# Patient Record
Sex: FEMALE | Race: WHITE | Employment: FULL TIME | URBAN - METROPOLITAN AREA
[De-identification: names, ages, dates, MRNs, and addresses within clinical notes are randomized per-mention and may not be internally consistent; named-entity substitution may affect disease eponyms.]

---

## 2017-11-02 ENCOUNTER — HOSPITAL ENCOUNTER (OUTPATIENT)
Dept: MAMMOGRAPHY | Age: 57
Discharge: HOME | End: 2017-11-02
Attending: OBSTETRICS & GYNECOLOGY | Admitting: OBSTETRICS & GYNECOLOGY

## 2017-11-02 DIAGNOSIS — Z12.31 VISIT FOR SCREENING MAMMOGRAM: ICD-10-CM

## 2018-11-02 ENCOUNTER — HOSPITAL ENCOUNTER (OUTPATIENT)
Dept: MAMMOGRAPHY | Age: 58
Discharge: HOME | End: 2018-11-07
Payer: COMMERCIAL

## 2018-11-02 DIAGNOSIS — Z12.31 VISIT FOR SCREENING MAMMOGRAM: ICD-10-CM

## 2018-11-02 PROCEDURE — 77067 SCR MAMMO BI INCL CAD: CPT

## 2019-11-06 ENCOUNTER — HOSPITAL ENCOUNTER (OUTPATIENT)
Dept: MAMMOGRAPHY | Age: 59
Discharge: HOME | End: 2019-11-11
Payer: COMMERCIAL

## 2019-11-06 DIAGNOSIS — Z12.31 VISIT FOR SCREENING MAMMOGRAM: ICD-10-CM

## 2019-11-06 PROCEDURE — 77067 SCR MAMMO BI INCL CAD: CPT

## 2020-11-11 ENCOUNTER — HOSPITAL ENCOUNTER (OUTPATIENT)
Dept: MAMMOGRAPHY | Age: 60
Discharge: HOME | End: 2020-11-11
Payer: COMMERCIAL

## 2020-11-11 PROCEDURE — 77063 BREAST TOMOSYNTHESIS BI: CPT

## 2021-11-21 ENCOUNTER — HOSPITAL ENCOUNTER (EMERGENCY)
Age: 61
Discharge: HOME | End: 2021-11-21
Payer: COMMERCIAL

## 2021-11-21 ENCOUNTER — APPOINTMENT (OUTPATIENT)
Dept: GENERAL RADIOLOGY | Age: 61
End: 2021-11-21
Payer: COMMERCIAL

## 2021-11-21 VITALS
SYSTOLIC BLOOD PRESSURE: 162 MMHG | OXYGEN SATURATION: 100 % | RESPIRATION RATE: 18 BRPM | TEMPERATURE: 97.8 F | BODY MASS INDEX: 20.94 KG/M2 | HEART RATE: 77 BPM | DIASTOLIC BLOOD PRESSURE: 93 MMHG | HEIGHT: 65 IN | WEIGHT: 125.66 LBS

## 2021-11-21 DIAGNOSIS — S92.355A CLOSED NONDISPLACED FRACTURE OF FIFTH METATARSAL BONE OF LEFT FOOT, INITIAL ENCOUNTER: Primary | ICD-10-CM

## 2021-11-21 PROCEDURE — 99282 EMERGENCY DEPT VISIT SF MDM: CPT

## 2021-11-21 PROCEDURE — 73630 X-RAY EXAM OF FOOT: CPT

## 2021-11-21 PROCEDURE — 99283 EMERGENCY DEPT VISIT LOW MDM: CPT

## 2021-11-21 RX ORDER — VENLAFAXINE 37.5 MG/1
37.5 TABLET ORAL DAILY
COMMUNITY

## 2021-11-21 ASSESSMENT — ENCOUNTER SYMPTOMS
DIARRHEA: 0
VOMITING: 0
ABDOMINAL PAIN: 0
NAUSEA: 0
COUGH: 0
RHINORRHEA: 0
WHEEZING: 0
SHORTNESS OF BREATH: 0

## 2021-11-21 ASSESSMENT — PAIN SCALES - GENERAL: PAINLEVEL_OUTOF10: 10

## 2021-11-21 ASSESSMENT — PAIN DESCRIPTION - PAIN TYPE: TYPE: ACUTE PAIN

## 2021-11-21 ASSESSMENT — PAIN DESCRIPTION - LOCATION: LOCATION: FOOT

## 2021-11-21 ASSESSMENT — PAIN DESCRIPTION - ORIENTATION: ORIENTATION: LEFT

## 2021-11-22 NOTE — ED TRIAGE NOTES
Pt. States that she was getting up off of the floor about an hour ago and felt something \"pop\" in her left foot. Pt c/o pain mostly on the lateral side of left foot also going along the top. Good ROM.

## 2021-11-22 NOTE — ED PROVIDER NOTES
[facility]        Pt Name: Jj Moya  MRN: [id number]  [place] 1960  Date of evaluation: 11/21/2021  Provider: Ronie Ganser, PA-C  PCP: Samreen Grimm  Note Started: 8:15 PM EST       NANCY. I have evaluated this patient. My supervising physician was available for consultation. CHIEF COMPLAINT       Chief Complaint   Patient presents with    Foot Pain       HISTORY OF PRESENT ILLNESS   (Location, Timing/Onset, Context/Setting, Quality, Duration, Modifying Factors, Severity, Associated Signs and Symptoms)  Note limiting factors. Chief Complaint: Foot injury     Jj Myoa is a 62 y.o. female who presents for evaluation of injury to her left foot. Patient states that she was sitting on the floor and went to stand up and her left foot popped. States that she is since had pain and swelling laterally. Difficulty with ambulation. No numbness tingling weakness distally. States that she has tried ice and elevation without improvement in symptoms. She has no other injuries or complaints at this time. Nursing Notes were all reviewed and agreed with or any disagreements were addressed in the HPI. REVIEW OF SYSTEMS    (2-9 systems for level 4, 10 or more for level 5)     Review of Systems   Constitutional: Negative for appetite change, chills and fever. HENT: Negative for congestion and rhinorrhea. Respiratory: Negative for cough, shortness of breath and wheezing. Cardiovascular: Negative for chest pain. Gastrointestinal: Negative for abdominal pain, diarrhea, nausea and vomiting. Genitourinary: Negative for difficulty urinating, dysuria and hematuria. Musculoskeletal: Positive for arthralgias (L foot). Negative for neck pain and neck stiffness. Skin: Negative for rash. Neurological: Negative for weakness, numbness and headaches. Positives and Pertinent negatives as per HPI. Except as noted above in the ROS, all other systems were reviewed and negative. PAST MEDICAL HISTORY     Past Medical History:   Diagnosis Date    Anxiety          SURGICAL HISTORY   History reviewed. No pertinent surgical history. CURRENTMEDICATIONS       Previous Medications    VENLAFAXINE (EFFEXOR) 37.5 MG TABLET    Take 37.5 mg by mouth 3 times daily         ALLERGIES     Codeine    FAMILYHISTORY     History reviewed. No pertinent family history. SOCIAL HISTORY       Social History     Tobacco Use    Smoking status: Current Every Day Smoker    Smokeless tobacco: Not on file   Substance Use Topics    Alcohol use: Yes    Drug use: Never       SCREENINGS             PHYSICAL EXAM    (up to 7 for level 4, 8 or more for level 5)     ED Triage Vitals [11/21/21 1957]   BP Temp Temp Source Pulse Resp SpO2 Height Weight   (!) 172/78 97.8 °F (36.6 °C) Oral 77 18 100 % 5' 5\" (1.651 m) 125 lb 10.6 oz (57 kg)       Physical Exam  Vitals and nursing note reviewed. Constitutional:       Appearance: She is well-developed. She is not diaphoretic. HENT:      Head: Normocephalic and atraumatic. Right Ear: External ear normal.      Left Ear: External ear normal.      Nose: Nose normal.   Eyes:      General:         Right eye: No discharge. Left eye: No discharge. Cardiovascular:      Pulses: Normal pulses. Pulmonary:      Effort: Pulmonary effort is normal. No respiratory distress. Musculoskeletal:         General: Normal range of motion. Cervical back: Normal range of motion and neck supple. Feet:    Skin:     General: Skin is warm and dry. Neurological:      Mental Status: She is alert and oriented to person, place, and time. Sensory: Sensation is intact. Motor: Motor function is intact.    Psychiatric:         Behavior: Behavior normal.         DIAGNOSTIC RESULTS   LABS:    Labs Reviewed - No data to display    When ordered only abnormal lab results are displayed. All other labs were within normal range or not returned as of this dictation. EKG: When ordered, EKG's are interpreted by the Emergency Department Physician in the absence of a cardiologist.  Please see their note for interpretation of EKG. RADIOLOGY:   Non-plain film images such as CT, Ultrasound and MRI are read by the radiologist. Plain radiographic images are visualized and preliminarily interpreted by the ED Provider with the below findings:        Interpretation per the Radiologist below, if available at the time of this note:    XR FOOT LEFT (MIN 3 VIEWS)    (Results Pending)     No results found. PROCEDURES   Unless otherwise noted below, none     Procedures    CRITICAL CARE TIME   N/A    CONSULTS:  None      EMERGENCY DEPARTMENT COURSE and DIFFERENTIAL DIAGNOSIS/MDM:   Vitals:    Vitals:    11/21/21 1957 11/21/21 2032   BP: (!) 172/78 (!) 162/93   Pulse: 77    Resp: 18    Temp: 97.8 °F (36.6 °C)    TempSrc: Oral    SpO2: 100%    Weight: 125 lb 10.6 oz (57 kg)    Height: 5' 5\" (1.651 m)        Patient was given the following medications:  Medications - No data to display        Patient presents for evaluation of injury to her left foot. On exam, she is resting comfortably in bed no acute distress nontoxic. She is hypertensive but vitals otherwise stable and she is afebrile. Patient has tenderness swelling and ecchymotic discoloration to the lateral aspect of the left dorsal foot. There is no bony tenderness step-offs crepitus obvious deformity or dislocation. She is neurovascular intact distally and able to wiggle her toes. X-ray shows fracture of the left fifth metatarsal.  Is beginning orthopedic surgeon who recommends postop shoe and follow-up with her in the office on Tuesday. She was given Ace wrap, postop shoe. She can weight-bear as tolerated. Symptomatic and supportive care discussed including rest, ice and elevation. She can take Tylenol and/or ibuprofen.     I estimate there is LOW risk for COMPARTMENT SYNDROME, DEEP VENOUS THROMBOSIS, SEPTIC ARTHRITIS, TENDON OR NEUROVASCULAR INJURY, thus I consider the discharge disposition reasonable. Conditions for return to the ED were discussed such as any new or worsening symptoms or signs of neurovascular compromise, tractable pain or inability to ambulate. She is agreeable to this plan stable for discharge at this time. FINAL IMPRESSION      1.  Closed nondisplaced fracture of fifth metatarsal bone of left foot, initial encounter          DISPOSITION/PLAN   DISPOSITION Decision To Discharge 11/21/2021 09:32:03 PM      PATIENT REFERRED TO:  Wilbert Ramos MD  [address] [facility], [address] [place] [address] [address]  [address] [facility]  [phone]    Go on 11/23/2021  [place]  Emergency Department  [facility] [address]  [phone]        [facility] Emergency Department  [facility] [address]  [phone]  Go to   If symptoms worsen      DISCHARGE MEDICATIONS:  New Prescriptions    No medications on file       DISCONTINUED MEDICATIONS:  Discontinued Medications    No medications on file              (Please note that portions of this note were completed with a voice recognition program.  Efforts were made to edit the dictations but occasionally words are mis-transcribed.)    Ronie Ganser, PA-C (electronically signed)           [place], [place]  11/21/21 3803

## 2021-11-22 NOTE — ED NOTES
Pt discharged in stable condition, VSS, no signs of distress. Discharge instructions and meds reviewed. Pt verbalizes understanding and states no further questions or concerns unaddressed.        Robyn Callahan RN  11/21/21 8718

## 2021-11-23 ENCOUNTER — OFFICE VISIT (OUTPATIENT)
Dept: ORTHOPEDIC SURGERY | Age: 61
End: 2021-11-23
Payer: COMMERCIAL

## 2021-11-23 VITALS — HEIGHT: 65 IN | WEIGHT: 125 LBS | BODY MASS INDEX: 20.83 KG/M2

## 2021-11-23 DIAGNOSIS — F17.200 CURRENT SMOKER: ICD-10-CM

## 2021-11-23 DIAGNOSIS — S92.352A CLOSED DISPLACED FRACTURE OF FIFTH METATARSAL BONE OF LEFT FOOT, INITIAL ENCOUNTER: Primary | ICD-10-CM

## 2021-11-23 PROCEDURE — 99406 BEHAV CHNG SMOKING 3-10 MIN: CPT | Performed by: ORTHOPAEDIC SURGERY

## 2021-11-23 PROCEDURE — 99203 OFFICE O/P NEW LOW 30 MIN: CPT | Performed by: ORTHOPAEDIC SURGERY

## 2021-11-23 NOTE — PROGRESS NOTES
CHIEF COMPLAINT: Left foot pain/ 5th MT shaft minimally displaced fracture. DATE OF INJURY:  11/20/2021, DOT 11/23/2021    HISTORY:  Ms. Ailin Tolliver 62 y.o.  female presents today for the first visit for evaluation of a left foot injury which occurred when she fell. She is complaining of lateral foot pain and swelling. Rates pain a 8/10 VAS. This is better with elevation and worse with bearing any wt. The pain is sharp and not radiating. No other complaint. She was seen 1st at [facility], where she was x-rayed and splinted and asked to f/u with orthopaedics. She is a smoker. She works as RN at [employer]. Past Medical History:   Diagnosis Date    Anxiety        History reviewed. No pertinent surgical history. Social History     Socioeconomic History    Marital status:      Spouse name: Not on file    Number of children: Not on file    Years of education: Not on file    Highest education level: Not on file   Occupational History    Not on file   Tobacco Use    Smoking status: Current Every Day Smoker    Smokeless tobacco: Never Used   Vaping Use    Vaping Use: Never used   Substance and Sexual Activity    Alcohol use: Yes    Drug use: Never    Sexual activity: Not on file   Other Topics Concern    Not on file   Social History Narrative    Not on file     Social Determinants of Health     Financial Resource Strain:     Difficulty of Paying Living Expenses: Not on file   Food Insecurity:     Worried About Running Out of Food in the Last Year: Not on file    Carline of Food in the Last Year: Not on file   Transportation Needs:     Lack of Transportation (Medical): Not on file    Lack of Transportation (Non-Medical):  Not on file   Physical Activity:     Days of Exercise per Week: Not on file    Minutes of Exercise per Session: Not on file   Stress:     Feeling of Stress : Not on file   Social Connections:     Frequency of Communication with Friends and Family: Not on file    Frequency of Social Gatherings with Friends and Family: Not on file    Attends Hinduism Services: Not on file    Active Member of Clubs or Organizations: Not on file    Attends Club or Organization Meetings: Not on file    Marital Status: Not on file   Intimate Partner Violence:     Fear of Current or Ex-Partner: Not on file    Emotionally Abused: Not on file    Physically Abused: Not on file    Sexually Abused: Not on file   Housing Stability:     Unable to Pay for Housing in the Last Year: Not on file    Number of [place] in the Last Year: Not on file    Unstable Housing in the Last Year: Not on file       History reviewed. No pertinent family history. Current Outpatient Medications on File Prior to Visit   Medication Sig Dispense Refill    venlafaxine (EFFEXOR) 37.5 MG tablet Take 37.5 mg by mouth 3 times daily       No current facility-administered medications on file prior to visit. Pertinent items are noted in HPI  Review of systems reviewed from Patient History Form dated on 11/23/2021 and available in the patient's chart under the Media tab. No change. PHYSICAL EXAMINATION:  Ms. Nikki La is a very pleasant 62 y.o.  female who presents today in no acute distress, awake, alert, and oriented. She is well dressed, nourished and  groomed. Patient with normal affect. Height is  5' 5\" (1.651 m), weight is 125 lb (56.7 kg), Body mass index is 20.8 kg/m². Resting respiratory rate is 16. Examination of the gait, showed that the patient walks with a crutch, PWB left leg in a postop shoe. Examination of both ankles showing a decreased range of motion of the left ankle compare to the other side because of foot pain. There is moderate swelling that can be seen, as well as ecchymosis over lateral side of the left foot. She  has intact sensation and good pedal pulses.   She has significant tenderness on deep palpation over the 5th MT shaft left foot                  IMAGING: Josiane Albrecht were reviewed, dated 11/21/2021,  3 views of the left foot, and showed a minimally displaced 5th MT shaft fracture. IMPRESSION: Left 5th MT shaft minimally displaced fracture. PLAN: I discussed that the overall alignment of this fracture is good and that we can try to treat this non-operatively in a post-op shoe with WBAT. We discussed the risk of nonunion and  malunion. We will see her  back in 6 weeks at which time we will get a new xray of the left foot. The patient smokes, and we discussed with the patient the risks of smoking on general health and also on bone and soft tissue healing (delay and non-union), and promised to cut down or stop smoking. Smoking: Educated the patient regarding the hazards of smoking and that it harms their body in many ways. It increases the chance of developing heart disease, lung disease, cancer, and other health problems including poor bone and wound healing. The importance of smoking cessation for optimal bone and wound healing was stressed. This was communicated verbally, 5 Minutes.       Yves Mi MD

## 2022-01-06 ENCOUNTER — OFFICE VISIT (OUTPATIENT)
Dept: ORTHOPEDIC SURGERY | Age: 62
End: 2022-01-06

## 2022-01-06 VITALS — WEIGHT: 125 LBS | HEIGHT: 65 IN | BODY MASS INDEX: 20.83 KG/M2

## 2022-01-06 DIAGNOSIS — S92.352A CLOSED DISPLACED FRACTURE OF FIFTH METATARSAL BONE OF LEFT FOOT, INITIAL ENCOUNTER: Primary | ICD-10-CM

## 2022-01-06 PROCEDURE — 99024 POSTOP FOLLOW-UP VISIT: CPT | Performed by: NURSE PRACTITIONER

## 2022-01-06 PROCEDURE — APPNB15 APP NON BILLABLE TIME 0-15 MINS: Performed by: NURSE PRACTITIONER

## 2022-01-07 NOTE — PROGRESS NOTES
CHIEF COMPLAINT: Left foot pain/ 5th MT shaft minimally displaced fracture. DATE OF INJURY:  11/20/2021, DOT 11/23/2021    HISTORY:  Ms. Katherine Stratton 62 y.o.  female presents today for the follow up visit for evaluation of a left foot injury which occurred when she fell. She rates her pain a 2/10 VAS and is improving. This is better with rest and worse with walking. The pain is mild achy and causing her to walk on the medial side of her foot d/t pain. No other complaint. She was seen 1st at [facility], where she was x-rayed and splinted and asked to f/u with orthopaedics. She is a smoker. 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 Past Medical History:   Diagnosis Date    Anxiety        History reviewed. No pertinent surgical history. Social History     Socioeconomic History    Marital status:      Spouse name: Not on file    Number of children: Not on file    Years of education: Not on file    Highest education level: Not on file   Occupational History    Not on file   Tobacco Use    Smoking status: Current Every Day Smoker    Smokeless tobacco: Never Used   Vaping Use    Vaping Use: Never used   Substance and Sexual Activity    Alcohol use: Yes    Drug use: Never    Sexual activity: Not on file   Other Topics Concern    Not on file   Social History Narrative    Not on file     Social Determinants of Health     Financial Resource Strain:     Difficulty of Paying Living Expenses: Not on file   Food Insecurity:     Worried About Running Out of Food in the Last Year: Not on file    Carline of Food in the Last Year: Not on file   Transportation Needs:     Lack of Transportation (Medical): Not on file    Lack of Transportation (Non-Medical):  Not on file   Physical Activity:     Days of Exercise per Week: Not on file    Minutes of Exercise per Session: Not on file   Stress:     Feeling of Stress : Not on file   Social Connections:     Frequency of Communication with Friends and Family: Not on file    Frequency of Social Gatherings with Friends and Family: Not on file    Attends Sikhism Services: Not on file    Active Member of Clubs or Organizations: Not on file    Attends Club or Organization Meetings: Not on file    Marital Status: Not on file   Intimate Partner Violence:     Fear of Current or Ex-Partner: Not on file    Emotionally Abused: Not on file    Physically Abused: Not on file    Sexually Abused: Not on file   Housing Stability:     Unable to Pay for Housing in the Last Year: Not on file    Number of [place] in the Last Year: Not on file    Unstable Housing in the Last Year: Not on file       History reviewed. No pertinent family history. Current Outpatient Medications on File Prior to Visit   Medication Sig Dispense Refill    venlafaxine (EFFEXOR) 37.5 MG tablet Take 37.5 mg by mouth 3 times daily       No current facility-administered medications on file prior to visit. Pertinent items are noted in HPI  Review of systems reviewed from Patient History Form dated on 11/23/2021 and available in the patient's chart under the Media tab. No change. PHYSICAL EXAMINATION:  Ms. Mary Kaiser is a very pleasant 62 y.o.  female who presents today in no acute distress, awake, alert, and oriented. She is well dressed, nourished and  groomed. Patient with normal affect. Height is  5' 5\" (1.651 m), weight is 125 lb (56.7 kg), Body mass index is 20.8 kg/m². Resting respiratory rate is 16. Examination of the gait, showed that the patient walks with a limp, WB left leg in a postop shoe. Examination of both ankles showing a decreased range of motion of the left ankle compare to the other side because of foot pain. There is mild swelling that can be seen, no ecchymosis over lateral side of the left foot. She  has intact sensation and good pedal pulses.   She has mild to moderate tenderness on deep palpation over the 5th MT shaft left foot            IMAGING: Xray's were reviewed, dated today in office,  3 views of the left foot, and showed a minimally displaced 5th MT shaft fracture, healing well. IMPRESSION: Left 5th MT shaft minimally displaced fracture. PLAN: I discussed that the overall alignment of this fracture is good. She can discontinue the post-op shoe with WBAT. No heavy impact activities. She was instructed to work on ROM and strengthening exercises. She would like to do it on her own. We will see her  back in 2 months at which time we will get a new xray of the left foot. The patient smokes, and we discussed with the patient the risks of smoking on general health and also on bone and soft tissue healing (delay and non-union), and promised to cut down or stop smoking. Smoking: Educated the patient regarding the hazards of smoking and that it harms their body in many ways. It increases the chance of developing heart disease, lung disease, cancer, and other health problems including poor bone and wound healing. The importance of smoking cessation for optimal bone and wound healing was stressed. This was communicated verbally, 5 Minutes.       RACQUEL Bueno - CNP

## 2022-03-20 ENCOUNTER — APPOINTMENT (OUTPATIENT)
Dept: GENERAL RADIOLOGY | Age: 62
End: 2022-03-20
Payer: COMMERCIAL

## 2022-03-20 ENCOUNTER — HOSPITAL ENCOUNTER (OUTPATIENT)
Age: 62
Setting detail: OBSERVATION
Discharge: HOME | End: 2022-03-21
Attending: INTERNAL MEDICINE | Admitting: INTERNAL MEDICINE
Payer: COMMERCIAL

## 2022-03-20 DIAGNOSIS — R07.9 CHEST PAIN, UNSPECIFIED TYPE: Primary | ICD-10-CM

## 2022-03-20 DIAGNOSIS — R03.0 ELEVATED BLOOD PRESSURE READING: ICD-10-CM

## 2022-03-20 LAB
ANION GAP SERPL CALCULATED.3IONS-SCNC: 11 MMOL/L (ref 3–16)
BASOPHILS ABSOLUTE: 0.1 K/UL (ref 0–0.2)
BASOPHILS RELATIVE PERCENT: 0.9 %
BUN BLDV-MCNC: 8 MG/DL (ref 7–20)
CALCIUM SERPL-MCNC: 10.5 MG/DL (ref 8.3–10.6)
CHLORIDE BLD-SCNC: 98 MMOL/L (ref 99–110)
CO2: 26 MMOL/L (ref 21–32)
CREAT SERPL-MCNC: 0.6 MG/DL (ref 0.6–1.2)
D DIMER: <200 NG/ML DDU (ref 0–229)
EOSINOPHILS ABSOLUTE: 0.1 K/UL (ref 0–0.6)
EOSINOPHILS RELATIVE PERCENT: 1.9 %
GFR AFRICAN AMERICAN: >60
GFR NON-AFRICAN AMERICAN: >60
GLUCOSE BLD-MCNC: 105 MG/DL (ref 70–99)
HCT VFR BLD CALC: 41.2 % (ref 36–48)
HEMOGLOBIN: 13.9 G/DL (ref 12–16)
INR BLD: 1.01 (ref 0.88–1.12)
LIPASE: 27 U/L (ref 13–60)
LYMPHOCYTES ABSOLUTE: 1.6 K/UL (ref 1–5.1)
LYMPHOCYTES RELATIVE PERCENT: 23.8 %
MAGNESIUM: 2.2 MG/DL (ref 1.8–2.4)
MCH RBC QN AUTO: 31.2 PG (ref 26–34)
MCHC RBC AUTO-ENTMCNC: 33.8 G/DL (ref 31–36)
MCV RBC AUTO: 92.3 FL (ref 80–100)
MONOCYTES ABSOLUTE: 0.6 K/UL (ref 0–1.3)
MONOCYTES RELATIVE PERCENT: 8.3 %
NEUTROPHILS ABSOLUTE: 4.5 K/UL (ref 1.7–7.7)
NEUTROPHILS RELATIVE PERCENT: 65.1 %
PDW BLD-RTO: 13.2 % (ref 12.4–15.4)
PLATELET # BLD: 271 K/UL (ref 135–450)
PMV BLD AUTO: 7.6 FL (ref 5–10.5)
POTASSIUM SERPL-SCNC: 4.9 MMOL/L (ref 3.5–5.1)
PRO-BNP: 91 PG/ML (ref 0–124)
PROTHROMBIN TIME: 11.4 SEC (ref 9.9–12.7)
RBC # BLD: 4.46 M/UL (ref 4–5.2)
SODIUM BLD-SCNC: 135 MMOL/L (ref 136–145)
TROPONIN: <0.01 NG/ML
TROPONIN: <0.01 NG/ML
WBC # BLD: 6.9 K/UL (ref 4–11)

## 2022-03-20 PROCEDURE — G0378 HOSPITAL OBSERVATION PER HR: HCPCS

## 2022-03-20 PROCEDURE — 84484 ASSAY OF TROPONIN QUANT: CPT

## 2022-03-20 PROCEDURE — 2580000003 HC RX 258: Performed by: INTERNAL MEDICINE

## 2022-03-20 PROCEDURE — 85610 PROTHROMBIN TIME: CPT

## 2022-03-20 PROCEDURE — 83690 ASSAY OF LIPASE: CPT

## 2022-03-20 PROCEDURE — 93005 ELECTROCARDIOGRAM TRACING: CPT | Performed by: INTERNAL MEDICINE

## 2022-03-20 PROCEDURE — 83735 ASSAY OF MAGNESIUM: CPT

## 2022-03-20 PROCEDURE — 83880 ASSAY OF NATRIURETIC PEPTIDE: CPT

## 2022-03-20 PROCEDURE — 6370000000 HC RX 637 (ALT 250 FOR IP): Performed by: PHYSICIAN ASSISTANT

## 2022-03-20 PROCEDURE — 71046 X-RAY EXAM CHEST 2 VIEWS: CPT

## 2022-03-20 PROCEDURE — 85379 FIBRIN DEGRADATION QUANT: CPT

## 2022-03-20 PROCEDURE — 80048 BASIC METABOLIC PNL TOTAL CA: CPT

## 2022-03-20 PROCEDURE — 36415 COLL VENOUS BLD VENIPUNCTURE: CPT

## 2022-03-20 PROCEDURE — 85025 COMPLETE CBC W/AUTO DIFF WBC: CPT

## 2022-03-20 PROCEDURE — 99284 EMERGENCY DEPT VISIT MOD MDM: CPT

## 2022-03-20 RX ORDER — AMLODIPINE BESYLATE 5 MG/1
5 TABLET ORAL DAILY
Status: DISCONTINUED | OUTPATIENT
Start: 2022-03-20 | End: 2022-03-21

## 2022-03-20 RX ORDER — ACETAMINOPHEN 650 MG/1
650 SUPPOSITORY RECTAL EVERY 6 HOURS PRN
Status: DISCONTINUED | OUTPATIENT
Start: 2022-03-20 | End: 2022-03-21 | Stop reason: HOSPADM

## 2022-03-20 RX ORDER — POTASSIUM CHLORIDE 7.45 MG/ML
10 INJECTION INTRAVENOUS PRN
Status: DISCONTINUED | OUTPATIENT
Start: 2022-03-20 | End: 2022-03-21 | Stop reason: HOSPADM

## 2022-03-20 RX ORDER — ASPIRIN 81 MG/1
324 TABLET, CHEWABLE ORAL ONCE
Status: COMPLETED | OUTPATIENT
Start: 2022-03-20 | End: 2022-03-20

## 2022-03-20 RX ORDER — POLYETHYLENE GLYCOL 3350 17 G/17G
17 POWDER, FOR SOLUTION ORAL DAILY PRN
Status: DISCONTINUED | OUTPATIENT
Start: 2022-03-20 | End: 2022-03-21 | Stop reason: HOSPADM

## 2022-03-20 RX ORDER — ACETAMINOPHEN 325 MG/1
650 TABLET ORAL EVERY 6 HOURS PRN
Status: DISCONTINUED | OUTPATIENT
Start: 2022-03-20 | End: 2022-03-21 | Stop reason: HOSPADM

## 2022-03-20 RX ORDER — SODIUM CHLORIDE 9 MG/ML
25 INJECTION, SOLUTION INTRAVENOUS PRN
Status: DISCONTINUED | OUTPATIENT
Start: 2022-03-20 | End: 2022-03-21 | Stop reason: HOSPADM

## 2022-03-20 RX ORDER — MAGNESIUM SULFATE IN WATER 40 MG/ML
2000 INJECTION, SOLUTION INTRAVENOUS PRN
Status: DISCONTINUED | OUTPATIENT
Start: 2022-03-20 | End: 2022-03-21 | Stop reason: HOSPADM

## 2022-03-20 RX ORDER — AMLODIPINE BESYLATE 5 MG/1
10 TABLET ORAL DAILY
Status: DISCONTINUED | OUTPATIENT
Start: 2022-03-20 | End: 2022-03-20

## 2022-03-20 RX ORDER — SODIUM CHLORIDE 0.9 % (FLUSH) 0.9 %
10 SYRINGE (ML) INJECTION PRN
Status: DISCONTINUED | OUTPATIENT
Start: 2022-03-20 | End: 2022-03-21 | Stop reason: HOSPADM

## 2022-03-20 RX ORDER — ONDANSETRON 4 MG/1
4 TABLET, ORALLY DISINTEGRATING ORAL EVERY 8 HOURS PRN
Status: DISCONTINUED | OUTPATIENT
Start: 2022-03-20 | End: 2022-03-21 | Stop reason: HOSPADM

## 2022-03-20 RX ORDER — ONDANSETRON 2 MG/ML
4 INJECTION INTRAMUSCULAR; INTRAVENOUS EVERY 6 HOURS PRN
Status: DISCONTINUED | OUTPATIENT
Start: 2022-03-20 | End: 2022-03-21 | Stop reason: HOSPADM

## 2022-03-20 RX ORDER — POTASSIUM CHLORIDE 20 MEQ/1
40 TABLET, EXTENDED RELEASE ORAL PRN
Status: DISCONTINUED | OUTPATIENT
Start: 2022-03-20 | End: 2022-03-21 | Stop reason: HOSPADM

## 2022-03-20 RX ORDER — SODIUM CHLORIDE 0.9 % (FLUSH) 0.9 %
10 SYRINGE (ML) INJECTION EVERY 12 HOURS SCHEDULED
Status: DISCONTINUED | OUTPATIENT
Start: 2022-03-20 | End: 2022-03-21 | Stop reason: HOSPADM

## 2022-03-20 RX ORDER — VENLAFAXINE 37.5 MG/1
37.5 TABLET ORAL 3 TIMES DAILY
Status: DISCONTINUED | OUTPATIENT
Start: 2022-03-20 | End: 2022-03-20

## 2022-03-20 RX ORDER — VENLAFAXINE HYDROCHLORIDE 37.5 MG/1
37.5 CAPSULE, EXTENDED RELEASE ORAL DAILY
Status: DISCONTINUED | OUTPATIENT
Start: 2022-03-21 | End: 2022-03-21 | Stop reason: HOSPADM

## 2022-03-20 RX ADMIN — Medication 10 ML: at 21:23

## 2022-03-20 RX ADMIN — ASPIRIN 81 MG 324 MG: 81 TABLET ORAL at 13:31

## 2022-03-20 ASSESSMENT — PAIN DESCRIPTION - PAIN TYPE: TYPE: ACUTE PAIN

## 2022-03-20 ASSESSMENT — PAIN - FUNCTIONAL ASSESSMENT: PAIN_FUNCTIONAL_ASSESSMENT: 0-10

## 2022-03-20 ASSESSMENT — PAIN DESCRIPTION - LOCATION: LOCATION: CHEST

## 2022-03-20 ASSESSMENT — PAIN DESCRIPTION - DESCRIPTORS: DESCRIPTORS: TIGHTNESS

## 2022-03-20 ASSESSMENT — PAIN SCALES - GENERAL
PAINLEVEL_OUTOF10: 0
PAINLEVEL_OUTOF10: 0

## 2022-03-20 NOTE — PROGRESS NOTES
Pt admitted to room 3315. Oriented to room and call light system. Pt denies any chest pain. She has 40+ years smoking history. BP high- Dr. Dell clayton served.

## 2022-03-20 NOTE — H&P
HOSPITALISTS HISTORY AND PHYSICAL    Patient:  Kishore Matta  YOB: 1960  MRN: [id number]   PCP: Ingris Blanco         Acct: [de-identified]    Date of Admission: 3/20/2022  Date of Service: Patient seen and examined on 3/20/2022    Chief complaint:  Chest pain    History of Present Illness: The patient is a 62 y. o.female with a history of anxiety and tobacco dependence who presented to [facility] ED today with a 2-3 week history of intermittent substernal chest tightness up to 8/10, with the intensity of more like a pressure rather than pain. She mentions that she has been under increased stress recently. The patient otherwise denied fevers, chills, recent viral illness, nausea, vomiting, abdominal pain,shortness of breath with exertion or at rest.  She states that a few weeks ago, an EKG in her primary care physician's office showed PACs, but she does not have any known history of HTN, abnormal heart rhythms or CAD. She denied any problems urinating or symptoms of a urinary tract infection. In the ED, the patient was afebrile without leukocytosis. Troponin, D-dimer and Pro-BNP were unremarkable. CXR was unremarkable for an acute cardiopulmonary process. EKG showed normal sinus rate and rhythm. Her BP was incidentally elevated up to 203/93. It    REVIEW OF SYSTEMS: Pertinent positives as noted in the HPI. All other systems reviewed and negative. Constitutional: Negative for fever,chills or night sweats  ENT: Negative for rhinorrhea, epistaxis, hoarseness, sore throat.   Respiratory: Negative for shortness of breath,wheezing  Cardiovascular: + chest pain and palpitations  Gastrointestinal: Negative for nausea, vomiting, diarrhea  Genitourinary: Negative for polyuria, dysuria   Hematologic/Lymphatic: Negative for bleeding tendency, easy bruising  Musculoskeletal: Negative for myalgias and arthralgias  Neurologic: Negative for confusion,dysarthria. Skin: Negative for itching,rash  Psychiatric: + for anxiety  Endocrine: Negative for polydipsia,polyuria,heat /cold intolerance. Past Medical History:   has a past medical history of Anxiety. Diagnosis Date    Anxiety        Past Surgical History:   has no past surgical history on file. History reviewed. No pertinent surgical history. Medications:  No current facility-administered medications on file prior to encounter. Current Outpatient Medications on File Prior to Encounter   Medication Sig Dispense Refill    venlafaxine (EFFEXOR) 37.5 MG tablet Take 37.5 mg by mouth daily        Prior to Admission medications    Medication Sig Start Date End Date Taking? Authorizing Provider   venlafaxine (EFFEXOR) 37.5 MG tablet Take 37.5 mg by mouth daily     Historical Provider, MD       Allergies: Allergies   Allergen Reactions    Codeine       Codeine    Social History:  Patient Lives   SOCIAL: reports that she has been smoking cigarettes. She has a 20.00 pack-year smoking history. She has never used smokeless tobacco. She reports current alcohol use of about 14.0 standard drinks of alcohol per week. She reports that she does not use drugs. Social History     Socioeconomic History    Marital status:      Spouse name: None    Number of children: None    Years of education: None    Highest education level: None   Occupational History    None   Tobacco Use    Smoking status: Current Every Day Smoker     Packs/day: 0.50     Years: 40.00     Pack years: 20.00     Types: Cigarettes    Smokeless tobacco: Never Used   Vaping Use    Vaping Use: Never used   Substance and Sexual Activity    Alcohol use:  Yes     Alcohol/week: 14.0 standard drinks     Types: 14 Cans of beer per week     Comment:  2 beers daily    Drug use: Never    Sexual activity: None   Other Topics Concern    None   Social History Narrative    None     Social Determinants of Health     Financial Resource Strain:     Difficulty of Paying Living Expenses: Not on file   Food Insecurity:     Worried About Running Out of Food in the Last Year: Not on file    Carline of Food in the Last Year: Not on file   Transportation Needs:     Lack of Transportation (Medical): Not on file    Lack of Transportation (Non-Medical): Not on file   Physical Activity:     Days of Exercise per Week: Not on file    Minutes of Exercise per Session: Not on file   Stress:     Feeling of Stress : Not on file   Social Connections:     Frequency of Communication with Friends and Family: Not on file    Frequency of Social Gatherings with Friends and Family: Not on file    Attends Taoist Services: Not on file    Active Member of [address] [place] [address] [employer] or Organizations: Not on file    Attends Club or Organization Meetings: Not on file    Marital Status: Not on file   Intimate Partner Violence:     Fear of Current or Ex-Partner: Not on file    Emotionally Abused: Not on file    Physically Abused: Not on file    Sexually Abused: Not on file   Housing Stability:     Unable to Pay for Housing in the Last Year: Not on file    Number of [place] in the Last Year: Not on file    Unstable Housing in the Last Year: Not on file     TOBACCO:   reports that she has been smoking cigarettes. She has a 20.00 pack-year smoking history. She has never used smokeless tobacco.  ETOH:   reports current alcohol use of about 14.0 standard drinks of alcohol per week. Family History:  family history includes Breast Cancer in her mother and sister; Coronary Art Dis in her father; Diabetes in her maternal aunt and maternal uncle;  Heart Attack in her father; Heart Disease in her father. ,       Problem Relation Age of Onset    Breast Cancer Mother     Coronary Art Dis Father     Heart Disease Father     Heart Attack Father     Breast Cancer Sister     Diabetes Maternal Aunt     Diabetes Maternal Uncle        Physical Exam:  BP (!) 175/82   Pulse 75   Temp 98.6 °F (37 °C) (Oral)   Resp 16   Wt 122 lb (55.3 kg)   SpO2 97%   BMI 20.30 kg/m²     General appearance:  No apparent distress. Appears comfortable. Well nourished  HEENT: Atraumatic. Pupils equally round. Extraocular motion intact. Conjunctivae clear. Nose symmetric without evidence of discharge. Oral mucosa moist w/o erythema or exudate. Neck supple. No JVD. No carotid bruits. Trachea midline. Cardiovascular:  RRR w/ normal S1/S2. No murmurs, rubs or gallops. Respiratory: Clear to auscultation, bilaterally without rales/wheezes/rhonchi. Gastrointestinal: Abdomen soft, non-tender, non-distended with normal bowel sounds. Musculoskeletal:  Full ROM without deformity. Neurologic:  No speech or focal sensory/motor deficits. Cranial nerves grossly intact. No speech or motor deficits  Lymphatic: Deferred  Psychiatric:  Alert and oriented. Appropriate affect, not agitated  Vascular: Dorsalis pedis and radial pulses palpable. No peripheral edema. Capillary refill<3 seconds  Genitourinary: Deferred. Skin: No visible rashes or lesions. Warm, dry. Labs:  Recent Labs     03/20/22  1258   WBC 6.9   HGB 13.9   HCT 41.2        Recent Labs     03/20/22  1258   *   K 4.9   CL 98*   CO2 26   BUN 8   CREATININE 0.6   CALCIUM 10.5     No results for input(s): AST, ALT, BILIDIR, BILITOT, ALKPHOS in the last 72 hours.   Recent Labs     03/20/22  1258   INR 1.01     Recent Labs     03/20/22  1258   TROPONINI <0.01       Urinalysis:  No results found for: Daphene Buchanan, BACTERIA, RBCUA, BLOODU, [address], GLUCOSEU    Radiology:     CXR: I have reviewed the CXR with the following interpretation: No evidence for an acute cardiopulmonary process  EKG:  I have reviewed the EKG with the following interpretation: Normal sinus rate and rhythm    XR CHEST (2 VW)    Result Date: 3/20/2022  EXAMINATION: TWO XRAY VIEWS OF THE CHEST 3/20/2022 12:51 pm COMPARISON: None. HISTORY: ORDERING SYSTEM PROVIDED HISTORY: cp TECHNOLOGIST PROVIDED HISTORY: Reason for exam:->cp Reason for Exam: Chest Pain (chest tightness and fluttering over the last few weeks. states she woke up during the middle of the night with heartburn ) FINDINGS: The lungs appear clear. The heart and mediastinal structures are unremarkable. Bony thorax appears normal. Visualized upper abdomen is unremarkable. No abnormalities noted. ASSESSMENT/ PLAN:  1. Atypical chest pain likely secondary to hypertensive urgency. Patient was to be started on amlodipine 10 mg daily but she opted for 5 mg daily instead. She states that she is sensitive to medications at times and she prefers to start on the lower dose first. Trend troponin q6h, x2. Stress test in the am. Plan for discharge tomorrow if stress test unremarkable for evidence of ischemia. Lipid panel in the   2. Essential HTN: start on amlodipine  3. Tobacco dependence        Diet: Regular diet  Code Status: Full Code    Electronically signed by Servando Pereira MD on 3/20/2022 at 4:23 PM      Please note that although every effort was made to ensure the accuracy of this document, some typing or transcription errors may have occurred inadvertently. If you may need any clarification, please do not hesitate to contact me through [facility].        Servando Pereira MD    3/20/2022 4:23 PM

## 2022-03-20 NOTE — ED PROVIDER NOTES
[facility]        Pt Name: Maria Dolores Swan  MRN: [id number]  [place] 1960  Date of evaluation: 3/20/2022  Provider: Razia Fajardo PA-C  PCP: Vernie Goltz MASSA  Note Started: 2:12 PM EDT       NANCY. I have evaluated this patient. My supervising physician was available for consultation. CHIEF COMPLAINT       Chief Complaint   Patient presents with    Chest Pain     chest tightness and fluttering over the last few weeks. states she woke up during the middle of the night with heartburn        HISTORY OF PRESENT ILLNESS   (Location, Timing/Onset, Context/Setting, Quality, Duration, Modifying Factors, Severity, Associated Signs and Symptoms)  Note limiting factors. Chief Complaint: Chest pain    Maria Dolores Swan is a 62 y.o. female who presents to the emergency department with a chief complaint of some chest tightness and shortness of breath. States she has been having intermittent episodes over the past 3 or so weeks but states it will normally only last briefly up to a couple minutes and then go away. She got woken up with some pain in the middle of the night now has had constant pain since then and states she is now feeling slightly short of breath with this. She continues to smoke and has a family history of heart disease but denies any personal history of heart disease, hypertension, hyperlipidemia, diabetes. She does not take any medication on a daily basis. Denies any history of PE or DVT, recent long trip or travel, recent surgery, hemoptysis, unilateral leg swelling or tenderness or estrogen or hormone replacement. Denies any personal history of malignancy. She denies cough, fevers, nausea, vomiting abdominal pain or urinary or bowel symptoms. Nursing Notes were all reviewed and agreed with or any disagreements were addressed in the HPI.     REVIEW OF SYSTEMS    (2-9 systems for level 4, 10 or more for level 5) Review of Systems    Positives and Pertinent negatives as per HPI. Except as noted above in the ROS, all other systems were reviewed and negative. PAST MEDICAL HISTORY     Past Medical History:   Diagnosis Date    Anxiety          SURGICAL HISTORY   History reviewed. No pertinent surgical history. CURRENTMEDICATIONS       Previous Medications    VENLAFAXINE (EFFEXOR) 37.5 MG TABLET    Take 37.5 mg by mouth 3 times daily         ALLERGIES     Codeine    FAMILYHISTORY     History reviewed. No pertinent family history. SOCIAL HISTORY       Social History     Tobacco Use    Smoking status: Current Every Day Smoker     Packs/day: 0.50     Types: Cigarettes    Smokeless tobacco: Never Used   Vaping Use    Vaping Use: Never used   Substance Use Topics    Alcohol use: Yes     Alcohol/week: 14.0 standard drinks     Types: 14 Cans of beer per week     Comment:  2 beers daily    Drug use: Never       SCREENINGS    [place] Coma Scale  Eye Opening: Spontaneous  Best Verbal Response: Oriented  Best Motor Response: Obeys commands  [place] Coma Scale Score: 15        PHYSICAL EXAM    (up to 7 for level 4, 8 or more for level 5)     ED Triage Vitals [03/20/22 1235]   BP Temp Temp Source Pulse Resp SpO2 Height Weight   (!) 181/83 98.4 °F (36.9 °C) Oral 101 18 100 % -- 122 lb (55.3 kg)       Physical Exam  Vitals and nursing note reviewed. Constitutional:       Appearance: She is well-developed. She is not diaphoretic. HENT:      Head: Atraumatic. Nose: Nose normal.   Eyes:      General:         Right eye: No discharge. Left eye: No discharge. Cardiovascular:      Rate and Rhythm: Normal rate and regular rhythm. Heart sounds: No murmur heard. No friction rub. No gallop. Pulmonary:      Effort: Pulmonary effort is normal. No respiratory distress. Breath sounds: No stridor. No wheezing, rhonchi or rales.    Abdominal:      General: Bowel sounds are normal. There is no distension. Palpations: Abdomen is soft. There is no mass. Tenderness: There is no abdominal tenderness. There is no guarding or rebound. Hernia: No hernia is present. Musculoskeletal:         General: No swelling. Normal range of motion. Cervical back: Normal range of motion. Right lower leg: No edema. Left lower leg: No edema. Skin:     General: Skin is warm and dry. Findings: No erythema or rash. Neurological:      Mental Status: She is alert and oriented to person, place, and time. Cranial Nerves: No cranial nerve deficit. Psychiatric:         Behavior: Behavior normal.         DIAGNOSTIC RESULTS   LABS:    Labs Reviewed   BASIC METABOLIC PANEL - Abnormal; Notable for the following components:       Result Value    Sodium 135 (*)     Chloride 98 (*)     Glucose 105 (*)     All other components within normal limits   TROPONIN   CBC WITH AUTO DIFFERENTIAL   PROTIME-INR   LIPASE   BRAIN NATRIURETIC PEPTIDE   D-DIMER, QUANTITATIVE       When ordered only abnormal lab results are displayed. All other labs were within normal range or not returned as of this dictation. EKG: When ordered, EKG's are interpreted by the Emergency Department Physician in the absence of a cardiologist.  Please see their note for interpretation of EKG. RADIOLOGY:   Non-plain film images such as CT, Ultrasound and MRI are read by the radiologist. Plain radiographic images are visualized and preliminarily interpreted by the ED Provider with the below findings:        Interpretation per the Radiologist below, if available at the time of this note:    XR CHEST (2 VW)   Final Result   No abnormalities noted. XR CHEST (2 VW)    Result Date: 3/20/2022  EXAMINATION: TWO XRAY VIEWS OF THE CHEST 3/20/2022 12:51 pm COMPARISON: None.  HISTORY: ORDERING SYSTEM PROVIDED HISTORY: cp TECHNOLOGIST PROVIDED HISTORY: Reason for exam:->cp Reason for Exam: Chest Pain (chest tightness and fluttering over the last few weeks. states she woke up during the middle of the night with heartburn ) FINDINGS: The lungs appear clear. The heart and mediastinal structures are unremarkable. Bony thorax appears normal. Visualized upper abdomen is unremarkable. No abnormalities noted. PROCEDURES   Unless otherwise noted below, none     Procedures    CRITICAL CARE TIME       CONSULTS:  IP CONSULT TO HOSPITALIST      EMERGENCY DEPARTMENT COURSE and DIFFERENTIAL DIAGNOSIS/MDM:   Vitals:    Vitals:    03/20/22 1235   BP: (!) 181/83   Pulse: 101   Resp: 18   Temp: 98.4 °F (36.9 °C)   TempSrc: Oral   SpO2: 100%   Weight: 122 lb (55.3 kg)       Patient was given the following medications:  Medications   aspirin chewable tablet 324 mg (324 mg Oral Given 3/20/22 1331)           Patient present with some chest pain. Blood pressure is elevated here in the 180s. She was given aspirin. She did not really complain of any pain to states that she has this tightness in her chest that she is unable to rate. She never had a cardiac work-up. She does continue to smoke and has some family history of heart disease and is never had a cardiac work-up. D-dimer is less than 200. Low special for pulmonary embolus, aortic dissection, esophageal rupture, pericarditis, myocarditis or other emergent etiology. Given her persistent elevated blood pressure with worsening symptoms I discussed with hospitalist who admit her for further work-up or treatment. Do not believe any further work-up or testing is warranted at this time. FINAL IMPRESSION      1. Chest pain, unspecified type    2. Elevated blood pressure reading          DISPOSITION/PLAN   DISPOSITION Admitted 03/20/2022 02:02:49 PM      PATIENT REFERRED TO:  No follow-up provider specified.     DISCHARGE MEDICATIONS:  New Prescriptions    No medications on file       DISCONTINUED MEDICATIONS:  Discontinued Medications    No medications on file              (Please note that portions of this note were completed with a voice recognition program.  Efforts were made to edit the dictations but occasionally words are mis-transcribed.)    Cait Smith PA-C (electronically signed)            Cait Smith PA-C  03/20/22 5229

## 2022-03-20 NOTE — ED PROVIDER NOTES
EKG NOTE:    Sinus rhythm at a rate of 86 beats a minute with no acute ST elevations or depressions or pathologic Q waves. Normal axis. I was not involved with the care of this patient.        Jyothi Castillo MD  03/20/22 5075

## 2022-03-21 VITALS
BODY MASS INDEX: 19.99 KG/M2 | DIASTOLIC BLOOD PRESSURE: 81 MMHG | WEIGHT: 120 LBS | HEIGHT: 65 IN | RESPIRATION RATE: 16 BRPM | OXYGEN SATURATION: 99 % | SYSTOLIC BLOOD PRESSURE: 143 MMHG | HEART RATE: 65 BPM | TEMPERATURE: 98 F

## 2022-03-21 LAB
ANION GAP SERPL CALCULATED.3IONS-SCNC: 10 MMOL/L (ref 3–16)
BASOPHILS ABSOLUTE: 0 K/UL (ref 0–0.2)
BASOPHILS RELATIVE PERCENT: 0.7 %
BUN BLDV-MCNC: 10 MG/DL (ref 7–20)
CALCIUM SERPL-MCNC: 9.9 MG/DL (ref 8.3–10.6)
CHLORIDE BLD-SCNC: 106 MMOL/L (ref 99–110)
CHOLESTEROL, TOTAL: 259 MG/DL (ref 0–199)
CO2: 27 MMOL/L (ref 21–32)
CREAT SERPL-MCNC: 0.5 MG/DL (ref 0.6–1.2)
EKG ATRIAL RATE: 86 BPM
EKG DIAGNOSIS: NORMAL
EKG P AXIS: 93 DEGREES
EKG P-R INTERVAL: 166 MS
EKG Q-T INTERVAL: 372 MS
EKG QRS DURATION: 82 MS
EKG QTC CALCULATION (BAZETT): 445 MS
EKG R AXIS: 63 DEGREES
EKG T AXIS: 60 DEGREES
EKG VENTRICULAR RATE: 86 BPM
EOSINOPHILS ABSOLUTE: 0.4 K/UL (ref 0–0.6)
EOSINOPHILS RELATIVE PERCENT: 6.6 %
GFR AFRICAN AMERICAN: >60
GFR NON-AFRICAN AMERICAN: >60
GLUCOSE BLD-MCNC: 83 MG/DL (ref 70–99)
HCT VFR BLD CALC: 40.7 % (ref 36–48)
HDLC SERPL-MCNC: 89 MG/DL (ref 40–60)
HEMOGLOBIN: 13.6 G/DL (ref 12–16)
LDL CHOLESTEROL CALCULATED: 157 MG/DL
LV EF: 55 %
LV EF: 67 %
LVEF MODALITY: NORMAL
LVEF MODALITY: NORMAL
LYMPHOCYTES ABSOLUTE: 2 K/UL (ref 1–5.1)
LYMPHOCYTES RELATIVE PERCENT: 36 %
MCH RBC QN AUTO: 31.3 PG (ref 26–34)
MCHC RBC AUTO-ENTMCNC: 33.5 G/DL (ref 31–36)
MCV RBC AUTO: 93.5 FL (ref 80–100)
MONOCYTES ABSOLUTE: 0.5 K/UL (ref 0–1.3)
MONOCYTES RELATIVE PERCENT: 9.2 %
NEUTROPHILS ABSOLUTE: 2.6 K/UL (ref 1.7–7.7)
NEUTROPHILS RELATIVE PERCENT: 47.5 %
PDW BLD-RTO: 13.3 % (ref 12.4–15.4)
PLATELET # BLD: 262 K/UL (ref 135–450)
PMV BLD AUTO: 7.8 FL (ref 5–10.5)
POTASSIUM REFLEX MAGNESIUM: 4.5 MMOL/L (ref 3.5–5.1)
RBC # BLD: 4.35 M/UL (ref 4–5.2)
SODIUM BLD-SCNC: 143 MMOL/L (ref 136–145)
TRIGL SERPL-MCNC: 67 MG/DL (ref 0–150)
TROPONIN: <0.01 NG/ML
TSH REFLEX: 2.31 UIU/ML (ref 0.27–4.2)
VLDLC SERPL CALC-MCNC: 13 MG/DL
WBC # BLD: 5.5 K/UL (ref 4–11)

## 2022-03-21 PROCEDURE — 94760 N-INVAS EAR/PLS OXIMETRY 1: CPT

## 2022-03-21 PROCEDURE — 78452 HT MUSCLE IMAGE SPECT MULT: CPT | Performed by: INTERNAL MEDICINE

## 2022-03-21 PROCEDURE — A9502 TC99M TETROFOSMIN: HCPCS | Performed by: INTERNAL MEDICINE

## 2022-03-21 PROCEDURE — 93017 CV STRESS TEST TRACING ONLY: CPT | Performed by: INTERNAL MEDICINE

## 2022-03-21 PROCEDURE — 84443 ASSAY THYROID STIM HORMONE: CPT

## 2022-03-21 PROCEDURE — 80048 BASIC METABOLIC PNL TOTAL CA: CPT

## 2022-03-21 PROCEDURE — 99204 OFFICE O/P NEW MOD 45 MIN: CPT | Performed by: INTERNAL MEDICINE

## 2022-03-21 PROCEDURE — 93010 ELECTROCARDIOGRAM REPORT: CPT | Performed by: INTERNAL MEDICINE

## 2022-03-21 PROCEDURE — 36415 COLL VENOUS BLD VENIPUNCTURE: CPT

## 2022-03-21 PROCEDURE — 6370000000 HC RX 637 (ALT 250 FOR IP): Performed by: INTERNAL MEDICINE

## 2022-03-21 PROCEDURE — 84484 ASSAY OF TROPONIN QUANT: CPT

## 2022-03-21 PROCEDURE — G0378 HOSPITAL OBSERVATION PER HR: HCPCS

## 2022-03-21 PROCEDURE — 3430000000 HC RX DIAGNOSTIC RADIOPHARMACEUTICAL: Performed by: INTERNAL MEDICINE

## 2022-03-21 PROCEDURE — 80061 LIPID PANEL: CPT

## 2022-03-21 PROCEDURE — 85025 COMPLETE CBC W/AUTO DIFF WBC: CPT

## 2022-03-21 PROCEDURE — 2580000003 HC RX 258: Performed by: INTERNAL MEDICINE

## 2022-03-21 PROCEDURE — 93306 TTE W/DOPPLER COMPLETE: CPT

## 2022-03-21 RX ORDER — AMLODIPINE BESYLATE 2.5 MG/1
2.5 TABLET ORAL DAILY
Qty: 30 TABLET | Refills: 1 | OUTPATIENT
Start: 2022-03-22

## 2022-03-21 RX ORDER — ASPIRIN 81 MG/1
81 TABLET ORAL DAILY
Qty: 30 TABLET | Refills: 3 | Status: SHIPPED | OUTPATIENT
Start: 2022-03-22

## 2022-03-21 RX ORDER — AMLODIPINE BESYLATE 5 MG/1
2.5 TABLET ORAL DAILY
Status: DISCONTINUED | OUTPATIENT
Start: 2022-03-21 | End: 2022-03-21

## 2022-03-21 RX ORDER — ASPIRIN 81 MG/1
81 TABLET ORAL DAILY
Qty: 30 TABLET | Refills: 3 | OUTPATIENT
Start: 2022-03-22

## 2022-03-21 RX ORDER — ASPIRIN 81 MG/1
81 TABLET ORAL DAILY
Status: DISCONTINUED | OUTPATIENT
Start: 2022-03-21 | End: 2022-03-21 | Stop reason: HOSPADM

## 2022-03-21 RX ORDER — AMLODIPINE BESYLATE 5 MG/1
2.5 TABLET ORAL DAILY
Status: DISCONTINUED | OUTPATIENT
Start: 2022-03-21 | End: 2022-03-21 | Stop reason: HOSPADM

## 2022-03-21 RX ORDER — AMLODIPINE BESYLATE 2.5 MG/1
2.5 TABLET ORAL DAILY
Qty: 30 TABLET | Refills: 3 | Status: SHIPPED | OUTPATIENT
Start: 2022-03-22

## 2022-03-21 RX ADMIN — ASPIRIN 81 MG: 81 TABLET, COATED ORAL at 13:48

## 2022-03-21 RX ADMIN — TETROFOSMIN 30 MILLICURIE: 1.38 INJECTION, POWDER, LYOPHILIZED, FOR SOLUTION INTRAVENOUS at 09:05

## 2022-03-21 RX ADMIN — Medication 10 ML: at 10:38

## 2022-03-21 RX ADMIN — TETROFOSMIN 10 MILLICURIE: 1.38 INJECTION, POWDER, LYOPHILIZED, FOR SOLUTION INTRAVENOUS at 08:08

## 2022-03-21 RX ADMIN — AMLODIPINE BESYLATE 2.5 MG: 5 TABLET ORAL at 12:32

## 2022-03-21 ASSESSMENT — PAIN DESCRIPTION - DESCRIPTORS
DESCRIPTORS: TIGHTNESS
DESCRIPTORS: TIGHTNESS

## 2022-03-21 ASSESSMENT — PAIN DESCRIPTION - LOCATION
LOCATION: CHEST
LOCATION: CHEST

## 2022-03-21 ASSESSMENT — PAIN SCALES - GENERAL: PAINLEVEL_OUTOF10: 0

## 2022-03-21 ASSESSMENT — PAIN DESCRIPTION - PAIN TYPE
TYPE: ACUTE PAIN
TYPE: ACUTE PAIN

## 2022-03-21 NOTE — PROGRESS NOTES
Despite continued education regarding need to remain NPO, pt's  brought her snacks and drinks. Pt has consumed one 16-oz coke, and 32-oz tea. She has also consumed several pretzels. Pt stated she had not eaten in 16 hours, and she will not continue to abstain. Cardiology will be notified when they come to round on pt.

## 2022-03-21 NOTE — DISCHARGE SUMMARY
Hospital Medicine Discharge Summary    Patient:  Magaly Chapin  YOB: 1960  MRN: [id number]   PCP: Dorcas Bras: [de-identified]     Admit Date: 3/20/2022       Discharge Date:     Admitting Physician: Imelda Finch MD  Discharge Physician: Imelda Finch MD     Discharge diagnoses:  1. Atypical Chest pain, ddx of elevated blood pressure versus anginal  2. Hypertensive urgency  3. Tobacco dependence  4. Hyperlipidemia  5. Generalized anxiety disorder    Disposition:    [x] Home         Condition at Discharge: Stable    The patient was seen and examined on day of discharge and this discharge summary is in conjunction with any daily progress note from day of discharge. Hospital course:    Magaly Chapin is a 62 y.o. femalewith a history of anxiety and tobacco dependence who presented to [facility] ED today with a 2-3 week history of intermittent substernal chest tightness up to 8/10, with the intensity of more like a pressure rather than pain. She mentions that she has been under increased stress recently. The patient otherwise denied fevers, chills, recent viral illness, nausea, vomiting, abdominal pain,shortness of breath with exertion or at rest.  She states that a few weeks ago, an EKG in her primary care physician's office showed PACs, but she does not have any known history of HTN, abnormal heart rhythms or CAD. She denied any problems urinating or symptoms of a urinary tract infection. In the ED, the patient was afebrile without leukocytosis. Troponin, D-dimer and Pro-BNP were unremarkable. CXR was unremarkable for an acute cardiopulmonary process. EKG showed normal sinus rate and rhythm.  Her BP was incidentally elevated up to 203/93. She was started on amlodipine 2.5 mg for her hypertension , Her cholesterol profile was abnormal, however the patient declined statins.  A stress test was abnormal with a small area of mildly decreased perfusion in the  apical anterior segment, which was present at rest and with stress consistent with a prior infarct. An echocardiogram and was significant for an Ef of 55% with mild to moderate mitral regurgitation. She was started on daily aspirin. She was advised that if her chest pain should recur that she should return to the emergency room for further evaluation. Discharge medications:       Current Discharge Medication List      START taking these medications    Details   amLODIPine (NORVASC) 2.5 MG tablet Take 1 tablet by mouth daily  Qty: 30 tablet, Refills: 3      aspirin 81 MG EC tablet Take 1 tablet by mouth daily  Qty: 30 tablet, Refills: 3           Current Discharge Medication List        Current Discharge Medication List      CONTINUE these medications which have NOT CHANGED    Details   venlafaxine (EFFEXOR) 37.5 MG tablet Take 37.5 mg by mouth daily            Current Discharge Medication List              Physical examination:   Vitals:  Vitals:    03/21/22 0717 03/21/22 1030 03/21/22 1215 03/21/22 1330   BP:  (!) 150/85 (!) 168/77 (!) 143/81   Pulse:  64 65    Resp: 16 16 16    Temp:  98 °F (36.7 °C) 98 °F (36.7 °C)    TempSrc:  Oral Oral    SpO2: 96% 96% 99%    Weight:       Height:           Weight: Weight: 120 lb (54.4 kg)     24 hour intake/output:    Intake/Output Summary (Last 24 hours) at 3/21/2022 1831  Last data filed at 3/21/2022 1030  Gross per 24 hour   Intake 725 ml   Output --   Net 725 ml       General appearance:  No apparent distress. Appears comfortable. Well nourished  HEENT: Atraumatic. Pupils equally round. Extraocular motion intact. Conjunctivae clear. Nose symmetric without evidence of discharge. Oral mucosa moist w/o erythema or exudate.  Neck supple. No JVD. No carotid bruits. Trachea midline. Cardiovascular:  RRR w/ normal S1/S2.  No murmurs, rubs or gallops. Respiratory: Clear to auscultation, bilaterally without rales/wheezes/rhonchi.   Gastrointestinal: Abdomen soft, TECHNOLOGIST PROVIDED HISTORY: Reason for exam:->cp Reason for Exam: Chest Pain (chest tightness and fluttering over the last few weeks. states she woke up during the middle of the night with heartburn ) FINDINGS: The lungs appear clear. The heart and mediastinal structures are unremarkable. Bony thorax appears normal. Visualized upper abdomen is unremarkable. No abnormalities noted. NM Cardiac Stress Test Nuclear Imaging    Result Date: 3/21/2022  Cardiac Perfusion Imaging  Demographics   Patient Name      [address] [place] [address]   Date of Study     03/21/2022          Gender             Female   Patient Number    [id number]          Date of Birth      1960   Visit Number      [id number]           Age                62 year(s)   Accession Number  [id number]          Room Number        8816   Corporate ID      [id number]             NM Technician      Mila Cadet   Nurse             Delroy Hernandez, REGGIE  Interpreting       Caitie Farooq DO                                        Physician   Ordering  Physician   The procedure was explained in detail to the patient. Risks,  complications and alternative treatments were reviewed. Written consent  was obtained. Procedure Procedure Type:   Nuclear Stress Test:Exercise, NM MYOCARDIAL SPECT REST EXERCISE OR RX   Study location: [facility] - Nuclear Medicine   Indications: Chest pain. Hospital Status: Outpatient. Height: 65 inches Weight: 120 pounds  Risk Factors   The patient risk factors include:physical activity, Current/Recent(w/in 1  year) tobacco use and hypertension. Conclusions   Summary  The overall quality of the study is good. There is breast artifact. Left ventricular cavity size is normal. RIght ventricle is normal in size. SPECT imaging demonstrates a small area of mildly decreased perfusion in the  apical anterior segment. The defect is present at rest and stress,  consistent with prior infarct. Sum stress score of 1. No visual TID. Calculated TID is 1.22 on segami. Left ventricular ejection fraction is normal at 67%. Sensitivity of testing reduced on anti-anginals. Myocardial perfusion is abnormal. Fixed defect. No reversible ischemia. Low  risk scan   Recommendation  Cardiology consultation  Stress Protocols   Resting ECG  Sinus rhythm. Prior anteroseptal MI. Resting HR:62 bpm  Resting BP:152/79 mmHg  Stress Protocol:Exercise - Aditya  Peak HR:139 bpm                  HR/BP product:[id number]  Peak BP:184/74 mmHg              Max exercise: 4.6 METS  Predicted HR: 160 bpm  % of predicted HR: 87  Test duration:4 min and 29 sec  Reason for termination:Completed   ECG Findings  SInus tachycardia. Arrhythmias   No significant rhythm abnormality. Symptoms  There was stress induced shortness of breath. Symptoms resolved with rest.  Denies chest pain or discomfort. Complications  Procedure complication was none. Stress Interpretation  No evidence of ischemia. Less than 0.5 mm upsloping ST deviation. Imaging Protocols   - One Day   Rest                          Stress   Isotope:Myoview/Tetrofosmin   Isotope: Myoview/Tetrofosmin  Isotope dose:9.74 mCi         Isotope dose:33.4 mCi  Administration Route:I.V. Administration Route:I.V.  Date:03/21/2022 08:06         Date:03/21/2022 09:06                                 Technique:      Gated  Imaging Results    Stress ejection    Ejection fraction:67 %    EDV :90 ml    ESV :30 ml    Stroke volume :60 ml    LV mass :129 gr  Medical History   Additional Medical History   Patient reported no past medical history.    Signatures   ------------------------------------------------------------------  Electronically signed by Ramandeep Wagner DO (Interpreting  physician) on 03/21/2022 at 12:19  ------------------------------------------------------------------        Consults:   IP CONSULT TO HOSPITALIST  IP CONSULT TO CARDIOLOGY    Discharge instructions:   Discharge lab work: None  Activity:   Diet: ADULT DIET; Regular; 4 carb choices (60 gm/meal); No Added Salt (3-4 gm)    Follow-up visits:  Ousmane Griffith  [address] [address]  [phone]    In 1 week  post-hospitalization, chest pain and elevated blood pressure       Code status at time of discharge:  Full Code       Time spent arranging discharge: 35 minutes in the examination, evaluation, counseling and review of medications and discharge plan.       Electronically signed by Neetu Benoit MD on 3/21/2022 at 6:31 PM

## 2022-03-21 NOTE — PROGRESS NOTES
Data- discharge order received, pt verbalized agreement to discharge, disposition to previous residence, no needs for HHC/DME. Action- discharge instructions prepared and given to pt and spouse, pt verbalized understanding. Medication information packet given r/t NEW and/or CHANGED prescriptions emphasizing name/purpose/side effects, pt verbalized understanding. Discharge instruction summary: Diet- regular, Activity- as tolerated, Primary Care Physician as followsLonny Dates [phone] f/u appointment to be scheduled by pt within 1 week, immunizations reviewed and N/A, prescription medications filled at pt's pharmacy of choice, [facility]. 1. WEIGHT: Admit Weight: 122 lb (55.3 kg) (03/20/22 1235)        Today  Weight: 120 lb (54.4 kg) (03/21/22 0615)       2. O2 SAT.: SpO2: 99 % (03/21/22 1215)    Response- Pt belongings gathered, IV removed, telemetry monitor removed and placed in bin at nurse's station. Disposition is home (no HHC/DME needs), transported with belongings, taken to lobby independently with husbandas pt pleasantly declined staff assitance , no complications.

## 2022-03-21 NOTE — CONSULTS
Cardiovascular Consultation     Attending Physician: Neetu Benoit MD    PATIENT: Madalyn Meigs  : 1960  MRN: [id number]    Reason for Consultation:   Chief Complaint   Patient presents with    Chest Pain     chest tightness and fluttering over the last few weeks. states she woke up during the middle of the night with heartburn      History of present illness:   Ms. Madalyn Meigs is a 62 y.o. female patient, with a medical history only reported as anxiety, who presented to the emergency department yesterday with concerns of chest tightness. She is a longstanding patient of Dr. Alonso Azevedo and visited him in his office in 2021 for concerns of palpitations. No associated chest pain lightheadedness or syncope at that time. EKG in the office reported as sinus rhythm with PACs. She recalls this well and states that she opted to continue to observe, declining medications or further work-up. 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 She had felt chest discomfort in 2021 to likely be musculoskeletal when it did not recur. She became concerned when she woke up with sensations of indigestion followed by a severe tightness gripping at \"both sides of chest\" into her axilla. No nausea vomiting diaphoresis shortness of breath syncope. Ruled out for acute coronary syndrome overnight and ordered a nuclear exercise stress test this morning. Consultation is to discuss these results. Resting blood pressure of 152/79. Patient denies being diagnosed with hypertension prior and has been hesitant to start recommended medication by admitting team.  Following the stress test she was agreeable to amlodipine 2.5 mg and denies symptoms following. Admits to smoking 1/4-1/2 a pack per day for over 40 years. Father had coronary stenting in his [de-identified].  is at bedside.   She also acknowledges increased stressors outside of work more recently. Medical History:      Diagnosis Date    Anxiety      Surgical History:  History reviewed. No pertinent surgical history. Social History:  Social History     Socioeconomic History    Marital status:      Spouse name: Not on file    Number of children: Not on file    Years of education: Not on file    Highest education level: Not on file   Occupational History    Not on file   Tobacco Use    Smoking status: Current Every Day Smoker     Packs/day: 0.50     Years: 40.00     Pack years: 20.00     Types: Cigarettes    Smokeless tobacco: Never Used   Vaping Use    Vaping Use: Never used   Substance and Sexual Activity    Alcohol use: Yes     Alcohol/week: 14.0 standard drinks     Types: 14 Cans of beer per week     Comment:  2 beers daily    Drug use: Never    Sexual activity: Not on file   Other Topics Concern    Not on file   Social History Narrative    Not on file     Social Determinants of Health     Financial Resource Strain:     Difficulty of Paying Living Expenses: Not on file   Food Insecurity:     Worried About Running Out of Food in the Last Year: Not on file    Carline of Food in the Last Year: Not on file   Transportation Needs:     Lack of Transportation (Medical): Not on file    Lack of Transportation (Non-Medical):  Not on file   Physical Activity:     Days of Exercise per Week: Not on file    Minutes of Exercise per Session: Not on file   Stress:     Feeling of Stress : Not on file   Social Connections:     Frequency of Communication with Friends and Family: Not on file    Frequency of Social Gatherings with Friends and Family: Not on file    Attends [employer]ism Services: Not on file    Active Member of Clubs or Organizations: Not on file    Attends Club or Organization Meetings: Not on file    Marital Status: Not on file   Intimate Partner Violence:     Fear of Current or Ex-Partner: Not on file    Emotionally Abused: Not on file    Physically Abused: Not on file    Sexually Abused: Not on file   Housing Stability:     Unable to Pay for Housing in the Last Year: Not on file    Number of Places Lived in the Last Year: Not on file    Unstable Housing in the Last Year: Not on file        Family History:  No evidence for sudden cardiac death or premature CAD.       Problem Relation Age of Onset    Breast Cancer Mother     Coronary Art Dis Father     Heart Disease Father     Heart Attack Father     Breast Cancer Sister     Diabetes Maternal Aunt     Diabetes Maternal Uncle        Medications:  amLODIPine (NORVASC) tablet 2.5 mg, Daily  aspirin EC tablet 81 mg, Daily  sodium chloride flush 0.9 % injection 10 mL, 2 times per day  sodium chloride flush 0.9 % injection 10 mL, PRN  0.9 % sodium chloride infusion, PRN  enoxaparin (LOVENOX) injection 40 mg, Nightly  ondansetron (ZOFRAN-ODT) disintegrating tablet 4 mg, Q8H PRN   Or  ondansetron (ZOFRAN) injection 4 mg, Q6H PRN  polyethylene glycol (GLYCOLAX) packet 17 g, Daily PRN  acetaminophen (TYLENOL) tablet 650 mg, Q6H PRN   Or  acetaminophen (TYLENOL) suppository 650 mg, Q6H PRN  potassium chloride (KLOR-CON M) extended release tablet 40 mEq, PRN   Or  potassium bicarb-citric acid (EFFER-K) effervescent tablet 40 mEq, PRN   Or  potassium chloride 10 mEq/100 mL IVPB (Peripheral Line), PRN  potassium chloride 10 mEq/100 mL IVPB (Peripheral Line), PRN  magnesium sulfate 2000 mg in 50 mL IVPB premix, PRN  venlafaxine (EFFEXOR XR) extended release capsule 37.5 mg, Daily        Allergies:  Codeine     Review of Systems:   [x]Full ROS obtained and negative except as mentioned in HPI    Physical Examination:    BP (!) 143/81   Pulse 65   Temp 98 °F (36.7 °C) (Oral)   Resp 16   Ht 5' 5\" (1.651 m)   Wt 120 lb (54.4 kg)   SpO2 99%   BMI 19.97 kg/m²   Wt Readings from Last 3 Encounters:   03/21/22 120 lb (54.4 kg)   01/06/22 125 lb (56.7 kg)   11/23/21 125 lb (56.7 kg)       GENERAL: Well developed, well nourished, no acute distress  NEUROLOGICAL: Alert and oriented x3  PSYCH: Normal mood and affect   SKIN: Warm and dry, without lesions  HEENT: Normocephalic, atraumatic, Sclera non-icteric, mucous membranes moist  NECK: supple, JVP normal, thyroid not enlarged   CAROTID: Normal upstroke, no bruits  CARDIAC: Normal PMI, regular rate and rhythm, normal S1S2, no murmur, rub  RESPIRATORY: Normal respiratory effort, clear to auscultation bilaterally  EXTREMITIES: No cyanosis, clubbing or edema, palpable pulses bilaterally   MUSCULOSKELETAL: No joint swelling or tenderness, no chest wall tenderness  GASTROINTESTINAL:  soft, non-tender, no bruit    Labs:  Lab Review   Lab Results   Component Value Date     03/21/2022    K 4.5 03/21/2022     03/21/2022    CO2 27 03/21/2022    BUN 10 03/21/2022    CREATININE 0.5 03/21/2022    GLUCOSE 83 03/21/2022    CALCIUM 9.9 03/21/2022     Lab Results   Component Value Date    TROPONINI <0.01 03/21/2022     Lab Results   Component Value Date    WBC 5.5 03/21/2022    HGB 13.6 03/21/2022    HCT 40.7 03/21/2022    MCV 93.5 03/21/2022     03/21/2022     Imaging:  I have reviewed the below testing personally:    EKG:   3/20/2022  Normal sinus rhythm  Possible Left atrial enlargement      STRESS TEST:  3/21/22       Summary    The overall quality of the study is good. There is breast artifact.        Left ventricular cavity size is normal. RIght ventricle is normal in size.        SPECT imaging demonstrates a small area of mildly decreased perfusion in the    apical anterior segment. The defect is present at rest and stress,    consistent with prior infarct.        Sum stress score of 1. No visual TID. Calculated TID is 1.22 on segami.        Left ventricular ejection fraction is normal at 67%.        Sensitivity of testing reduced on anti-anginals.        Myocardial perfusion is abnormal. Fixed defect. No reversible ischemia. Low    risk scan      Recommendation    Cardiology consultation       Stress Protocols        Resting ECG    Sinus rhythm. Prior anteroseptal MI. Resting HR:62 bpm  Resting BP:152/79 mmHg       Stress Protocol:Exercise - Aditya        Peak HR:139 bpm                  HR/BP product:[id number]    Peak BP:184/74 mmHg              Max exercise: 4.6 METS    Predicted HR: 160 bpm    % of predicted HR: 87    Test duration:4 min and 29 sec    Reason for termination:Completed        ECG Findings    SInus tachycardia.        Arrhythmias        No significant rhythm abnormality.        Symptoms    There was stress induced shortness of breath.    Symptoms resolved with rest.    Denies chest pain or discomfort. ProBNP 91  Troponin <0.01 x 3    Impression/Recommendations    Ms. Janie Zarco is a 62 y.o. female patient    Chest pain, possibly cardiac    Hypertension, newly diagnosed   Anxiety  Nicotine dependence, >20 PYH   Family history of ischemic heart disease, not premature       Mrs. Russ completed nuclear exercise stress testing this morning following ACS rule out. Echocardiogram pending. While she was restratified from intermediate risk (10 year ASCVD risk estimated at 13% by outdated lipids) to low risk following stress testing, there is a low threshold to pursue additional imaging should symptoms recur. Discussed small, mild, fixed anteroapical defect: artifact vs. Scar in the territory of the LAD as an active smoker. Compared coronary angiography, by CT and catheter based modalities for her and her . She would like to have this discussion with her longstanding PCP Dr. Angel Carranza. She is agreeable to ASA 81 mg and Amlodipine 2.5 mg. She is not agreeable to a statin. She wants to recheck both her lipids and blood pressure trend in his office. Stressed the importance of tobacco cessation. Thank you for allowing me to participate in the care of your patient. Please do not hesitate to call.      Magdalena Dorantes DO, [address]  Interventional Cardiology     o: [phone]  [address] [employer] [address], [address]      NOTE:  This report was transcribed using voice recognition software. Every effort was made to ensure accuracy; however, inadvertent computerized transcription errors may be present.

## 2022-03-21 NOTE — PROGRESS NOTES
Cardiology called and said that the patient's echo result were okay and she could go home as she previously discussed with patient. Attending updated.

## 2022-03-21 NOTE — PROGRESS NOTES
Patient instructed on Aditya Protocol Stress Test Procedure including possible side effects and adverse reactions. Verbalizes knowledge and understanding and denies having any questions.

## 2022-03-21 NOTE — CARE COORDINATION
DISCHARGE PLANNING NOTE;  Patient admitted as Observation  with an anticipated short hospitalization length of stay. Chart reviewed and it appears that patient has minimal needs for discharge at this time. Discussed with patients nurse and requested that case management be notified if discharge needs are identified. Case management will continue to follow progress and update discharge plan as needed.

## 2022-03-21 NOTE — PLAN OF CARE
Problem: Pain:  Goal: Pain level will decrease  Description: Pain level will decrease  Outcome: Ongoing  Goal: Control of acute pain  Description: Control of acute pain  Outcome: Ongoing  Goal: Control of chronic pain  Description: Control of chronic pain  Outcome: Ongoing  Goal: Patient's pain/discomfort is manageable  Description: Patient's pain/discomfort is manageable  Outcome: Ongoing     Problem: Infection:  Goal: Will remain free from infection  Description: Will remain free from infection  3/21/2022 1122 by Kanika Pal RN  Outcome: Ongoing  3/21/2022 0243 by Andrae Manrique RN  Outcome: Ongoing     Problem: Safety:  Goal: Free from accidental physical injury  Description: Free from accidental physical injury  3/21/2022 1122 by Kanika Pal RN  Outcome: Ongoing  3/21/2022 0243 by Andrae Manrique RN  Outcome: Ongoing  Goal: Free from intentional harm  Description: Free from intentional harm  3/21/2022 1122 by Kanika Pal RN  Outcome: Ongoing  3/21/2022 0243 by Andrae Manrique RN  Outcome: Ongoing     Problem: Skin Integrity:  Goal: Skin integrity will stabilize  Description: Skin integrity will stabilize  Outcome: Ongoing     Problem: Daily Care:  Goal: Daily care needs are met  Description: Daily care needs are met  Outcome: Ongoing

## 2022-03-21 NOTE — PLAN OF CARE
Problem: Infection:  Goal: Will remain free from infection  Description: Will remain free from infection  Outcome: Ongoing     Problem: Safety:  Goal: Free from accidental physical injury  Description: Free from accidental physical injury  Outcome: Ongoing     Problem: Safety:  Goal: Free from intentional harm  Description: Free from intentional harm  Outcome: Ongoing

## 2022-04-13 ENCOUNTER — HOSPITAL ENCOUNTER (OUTPATIENT)
Dept: MAMMOGRAPHY | Age: 62
Discharge: HOME | End: 2022-04-13
Payer: COMMERCIAL

## 2022-04-13 VITALS — WEIGHT: 121 LBS | BODY MASS INDEX: 20.16 KG/M2 | HEIGHT: 65 IN

## 2022-04-13 DIAGNOSIS — Z12.31 VISIT FOR SCREENING MAMMOGRAM: ICD-10-CM

## 2022-04-13 PROCEDURE — 77063 BREAST TOMOSYNTHESIS BI: CPT
